# Patient Record
Sex: FEMALE | Race: WHITE | NOT HISPANIC OR LATINO
[De-identification: names, ages, dates, MRNs, and addresses within clinical notes are randomized per-mention and may not be internally consistent; named-entity substitution may affect disease eponyms.]

---

## 2018-10-08 ENCOUNTER — TRANSCRIPTION ENCOUNTER (OUTPATIENT)
Age: 53
End: 2018-10-08

## 2018-11-02 ENCOUNTER — TRANSCRIPTION ENCOUNTER (OUTPATIENT)
Age: 53
End: 2018-11-02

## 2018-11-12 ENCOUNTER — TRANSCRIPTION ENCOUNTER (OUTPATIENT)
Age: 53
End: 2018-11-12

## 2019-03-23 ENCOUNTER — RECORD ABSTRACTING (OUTPATIENT)
Age: 54
End: 2019-03-23

## 2019-03-23 DIAGNOSIS — R06.02 SHORTNESS OF BREATH: ICD-10-CM

## 2019-03-23 DIAGNOSIS — K21.9 GASTRO-ESOPHAGEAL REFLUX DISEASE W/OUT ESOPHAGITIS: ICD-10-CM

## 2019-03-23 DIAGNOSIS — Z80.1 FAMILY HISTORY OF MALIGNANT NEOPLASM OF TRACHEA, BRONCHUS AND LUNG: ICD-10-CM

## 2019-03-23 DIAGNOSIS — Z80.41 FAMILY HISTORY OF MALIGNANT NEOPLASM OF OVARY: ICD-10-CM

## 2019-03-23 DIAGNOSIS — J45.909 UNSPECIFIED ASTHMA, UNCOMPLICATED: ICD-10-CM

## 2019-03-23 DIAGNOSIS — Z87.891 PERSONAL HISTORY OF NICOTINE DEPENDENCE: ICD-10-CM

## 2019-03-23 DIAGNOSIS — G43.909 MIGRAINE, UNSPECIFIED, NOT INTRACTABLE, W/OUT STATUS MIGRAINOSUS: ICD-10-CM

## 2019-03-23 DIAGNOSIS — E55.9 VITAMIN D DEFICIENCY, UNSPECIFIED: ICD-10-CM

## 2019-03-23 DIAGNOSIS — R51 HEADACHE: ICD-10-CM

## 2019-03-23 DIAGNOSIS — R07.89 OTHER CHEST PAIN: ICD-10-CM

## 2019-03-23 DIAGNOSIS — J30.9 ALLERGIC RHINITIS, UNSPECIFIED: ICD-10-CM

## 2019-03-23 DIAGNOSIS — F41.9 ANXIETY DISORDER, UNSPECIFIED: ICD-10-CM

## 2019-03-23 DIAGNOSIS — Z80.0 FAMILY HISTORY OF MALIGNANT NEOPLASM OF DIGESTIVE ORGANS: ICD-10-CM

## 2019-03-23 DIAGNOSIS — M72.2 PLANTAR FASCIAL FIBROMATOSIS: ICD-10-CM

## 2019-03-23 DIAGNOSIS — Z71.3 DIETARY COUNSELING AND SURVEILLANCE: ICD-10-CM

## 2019-03-23 DIAGNOSIS — Z83.79 FAMILY HISTORY OF OTHER DISEASES OF THE DIGESTIVE SYSTEM: ICD-10-CM

## 2019-03-23 DIAGNOSIS — Z78.9 OTHER SPECIFIED HEALTH STATUS: ICD-10-CM

## 2019-03-23 LAB — CYTOLOGY CVX/VAG DOC THIN PREP: NORMAL

## 2019-03-23 RX ORDER — ALBUTEROL SULFATE 90 UG/1
108 (90 BASE) AEROSOL, METERED RESPIRATORY (INHALATION)
Refills: 0 | Status: ACTIVE | COMMUNITY

## 2019-04-15 ENCOUNTER — APPOINTMENT (OUTPATIENT)
Dept: CARDIOLOGY | Facility: CLINIC | Age: 54
End: 2019-04-15
Payer: COMMERCIAL

## 2019-04-15 ENCOUNTER — NON-APPOINTMENT (OUTPATIENT)
Age: 54
End: 2019-04-15

## 2019-04-15 VITALS
BODY MASS INDEX: 36.26 KG/M2 | DIASTOLIC BLOOD PRESSURE: 70 MMHG | HEART RATE: 90 BPM | HEIGHT: 71 IN | OXYGEN SATURATION: 95 % | SYSTOLIC BLOOD PRESSURE: 110 MMHG | WEIGHT: 259 LBS

## 2019-04-15 DIAGNOSIS — Z87.09 PERSONAL HISTORY OF OTHER DISEASES OF THE RESPIRATORY SYSTEM: ICD-10-CM

## 2019-04-15 PROCEDURE — 99214 OFFICE O/P EST MOD 30 MIN: CPT

## 2019-04-15 PROCEDURE — 93000 ELECTROCARDIOGRAM COMPLETE: CPT

## 2019-04-15 RX ORDER — AMLODIPINE BESYLATE 5 MG/1
5 TABLET ORAL
Refills: 0 | Status: DISCONTINUED | COMMUNITY
End: 2019-04-15

## 2019-04-19 ENCOUNTER — TRANSCRIPTION ENCOUNTER (OUTPATIENT)
Age: 54
End: 2019-04-19

## 2019-05-13 ENCOUNTER — TRANSCRIPTION ENCOUNTER (OUTPATIENT)
Age: 54
End: 2019-05-13

## 2019-05-17 ENCOUNTER — APPOINTMENT (OUTPATIENT)
Dept: OBGYN | Facility: CLINIC | Age: 54
End: 2019-05-17

## 2019-11-09 ENCOUNTER — TRANSCRIPTION ENCOUNTER (OUTPATIENT)
Age: 54
End: 2019-11-09

## 2019-11-09 DIAGNOSIS — K59.00 CONSTIPATION, UNSPECIFIED: ICD-10-CM

## 2019-12-08 PROBLEM — K59.00 CONSTIPATION: Status: ACTIVE | Noted: 2019-03-23

## 2020-03-12 ENCOUNTER — TRANSCRIPTION ENCOUNTER (OUTPATIENT)
Age: 55
End: 2020-03-12

## 2020-04-13 ENCOUNTER — TRANSCRIPTION ENCOUNTER (OUTPATIENT)
Age: 55
End: 2020-04-13

## 2020-08-21 ENCOUNTER — TRANSCRIPTION ENCOUNTER (OUTPATIENT)
Age: 55
End: 2020-08-21

## 2021-04-23 ENCOUNTER — TRANSCRIPTION ENCOUNTER (OUTPATIENT)
Age: 56
End: 2021-04-23

## 2021-09-24 ENCOUNTER — TRANSCRIPTION ENCOUNTER (OUTPATIENT)
Age: 56
End: 2021-09-24

## 2021-10-12 ENCOUNTER — APPOINTMENT (OUTPATIENT)
Dept: OBGYN | Facility: CLINIC | Age: 56
End: 2021-10-12

## 2021-10-12 DIAGNOSIS — Z12.11 ENCOUNTER FOR SCREENING FOR MALIGNANT NEOPLASM OF COLON: ICD-10-CM

## 2021-10-12 DIAGNOSIS — Z01.419 ENCOUNTER FOR GYNECOLOGICAL EXAMINATION (GENERAL) (ROUTINE) W/OUT ABNORMAL FINDINGS: ICD-10-CM

## 2021-10-12 DIAGNOSIS — Z12.31 ENCOUNTER FOR SCREENING MAMMOGRAM FOR MALIGNANT NEOPLASM OF BREAST: ICD-10-CM

## 2021-10-12 DIAGNOSIS — R92.2 INCONCLUSIVE MAMMOGRAM: ICD-10-CM

## 2021-10-12 DIAGNOSIS — Z13.820 ENCOUNTER FOR SCREENING FOR OSTEOPOROSIS: ICD-10-CM

## 2021-11-29 ENCOUNTER — TRANSCRIPTION ENCOUNTER (OUTPATIENT)
Age: 56
End: 2021-11-29

## 2021-12-30 ENCOUNTER — APPOINTMENT (OUTPATIENT)
Dept: OBGYN | Facility: CLINIC | Age: 56
End: 2021-12-30

## 2022-03-26 ENCOUNTER — TRANSCRIPTION ENCOUNTER (OUTPATIENT)
Age: 57
End: 2022-03-26

## 2022-06-14 ENCOUNTER — NON-APPOINTMENT (OUTPATIENT)
Age: 57
End: 2022-06-14

## 2023-03-03 ENCOUNTER — TRANSCRIPTION ENCOUNTER (OUTPATIENT)
Age: 58
End: 2023-03-03

## 2023-04-28 ENCOUNTER — NON-APPOINTMENT (OUTPATIENT)
Age: 58
End: 2023-04-28

## 2023-05-08 ENCOUNTER — NON-APPOINTMENT (OUTPATIENT)
Age: 58
End: 2023-05-08

## 2023-05-09 ENCOUNTER — RESULT REVIEW (OUTPATIENT)
Age: 58
End: 2023-05-09

## 2023-05-09 ENCOUNTER — APPOINTMENT (OUTPATIENT)
Dept: HEMATOLOGY ONCOLOGY | Facility: CLINIC | Age: 58
End: 2023-05-09
Payer: COMMERCIAL

## 2023-05-09 VITALS
RESPIRATION RATE: 72 BRPM | HEIGHT: 71 IN | DIASTOLIC BLOOD PRESSURE: 71 MMHG | WEIGHT: 251.5 LBS | TEMPERATURE: 98.1 F | SYSTOLIC BLOOD PRESSURE: 127 MMHG | HEART RATE: 72 BPM | OXYGEN SATURATION: 97 % | BODY MASS INDEX: 35.21 KG/M2

## 2023-05-09 DIAGNOSIS — Z71.83 ENCOUNTER FOR NONPROCREATIVE GENETIC COUNSELING: ICD-10-CM

## 2023-05-09 DIAGNOSIS — J01.90 ACUTE SINUSITIS, UNSPECIFIED: ICD-10-CM

## 2023-05-09 DIAGNOSIS — Z86.19 PERSONAL HISTORY OF OTHER INFECTIOUS AND PARASITIC DISEASES: ICD-10-CM

## 2023-05-09 PROCEDURE — 36415 COLL VENOUS BLD VENIPUNCTURE: CPT

## 2023-05-09 PROCEDURE — 99205 OFFICE O/P NEW HI 60 MIN: CPT | Mod: 25

## 2023-05-09 RX ORDER — LOSARTAN POTASSIUM AND HYDROCHLOROTHIAZIDE 12.5; 5 MG/1; MG/1
50-12.5 TABLET ORAL DAILY
Refills: 0 | Status: COMPLETED | COMMUNITY
End: 2023-05-09

## 2023-05-09 RX ORDER — FLUTICASONE PROPIONATE AND SALMETEROL 50; 250 UG/1; UG/1
250-50 POWDER RESPIRATORY (INHALATION)
Refills: 0 | Status: COMPLETED | COMMUNITY
End: 2023-05-09

## 2023-05-09 RX ORDER — PSYLLIUM SEED (WITH DEXTROSE)
30.9 POWDER (GRAM) ORAL
Refills: 0 | Status: COMPLETED | COMMUNITY
End: 2023-05-09

## 2023-05-09 RX ORDER — EZETIMIBE 10 MG/1
TABLET ORAL
Refills: 0 | Status: ACTIVE | COMMUNITY

## 2023-05-09 RX ORDER — CHROMIUM 200 MCG
TABLET ORAL
Refills: 0 | Status: COMPLETED | COMMUNITY
End: 2023-05-09

## 2023-05-09 RX ORDER — FLUTICASONE PROPIONATE 50 MCG
50 SPRAY, SUSPENSION NASAL
Refills: 0 | Status: ACTIVE | COMMUNITY

## 2023-05-09 RX ORDER — FUROSEMIDE 40 MG/1
40 TABLET ORAL
Refills: 0 | Status: ACTIVE | COMMUNITY

## 2023-05-09 RX ORDER — HYDROCHLOROTHIAZIDE 12.5 MG/1
12.5 CAPSULE ORAL
Refills: 0 | Status: COMPLETED | COMMUNITY
End: 2023-05-09

## 2023-05-09 RX ORDER — OMEGA-3/DHA/EPA/FISH OIL 300-1000MG
1000 CAPSULE ORAL
Refills: 0 | Status: COMPLETED | COMMUNITY
End: 2023-05-09

## 2023-05-09 RX ORDER — RANITIDINE HYDROCHLORIDE 150 MG/1
150 TABLET, FILM COATED ORAL
Refills: 0 | Status: COMPLETED | COMMUNITY
End: 2023-05-09

## 2023-05-09 RX ORDER — GALCANEZUMAB 120 MG/ML
INJECTION, SOLUTION SUBCUTANEOUS
Refills: 0 | Status: ACTIVE | COMMUNITY

## 2023-05-09 RX ORDER — LOSARTAN POTASSIUM 25 MG/1
25 TABLET, FILM COATED ORAL
Refills: 0 | Status: COMPLETED | COMMUNITY
End: 2023-05-09

## 2023-05-09 RX ORDER — LACTULOSE 10 G/15ML
10 SOLUTION ORAL
Qty: 450 | Refills: 5 | Status: COMPLETED | COMMUNITY
Start: 2019-12-08 | End: 2023-05-09

## 2023-05-09 NOTE — HISTORY OF PRESENT ILLNESS
[de-identified] : 58 year old female presents today for initial consultation of hypogammaglobinemia, referred by Dr. Ingram.  She has been following with neurology for complaints of generalized pain in muscles and joints.  She had a normal EMG in both upper and lower extremities.  Blood work on 23 revealed hypogammaglobulinemia, negative ANCA, RF, CCP DS DNA, ESR, CRP, normal irons, B12 and folic acid, and immunofixation.  She reports getting sick often, repeated sinus infections 4-5 times per year, URI, bronchitis, and pneumonia x 3 over the past 10 years.  She reports she is still having muscle weakness in bilateral thighs which causes difficulty climbing stairs (she is on atorvastatin 80mg daily and has not held it, due to a questionable TIA last year.)  \par \par She denies ever having Pneumovax  \par \par Health Maintenance-\par Mammogram- due now\par Colonoscopy/EGD- approximately 7 years ago \par Pelvic/pap- many years ago \par \par Family Hx-\par Mother  from metastatic lung cancer (smoker) dx'd 67, she also had ovarian cancer at age 55\par Mother is the youngest of 14\par \par \par Social Hx-\par Former smoker, quit 15 yrs ago 1PPD x 10 years\par Rarely consumes ETOH\par Occasional marijuana (gummies) for myalgias\par works FT as a

## 2023-05-09 NOTE — ASSESSMENT
[FreeTextEntry1] : 58-year-old female referred for evaluation of increased frequency of infections and hypogammaglobulinemia by Dr. Izabella Ingram.  Patient reports increased frequency of the infections sinusitis and bronchitis requiring antibiotics at least 6 times per year.  She does have a cat at home and was tested and she is not allergic.  She was status tested for other allergies in the past and the results were negative.\par She was seen by ENT for sinusitis and there was no evidence of anatomical changes that would cause increased frequency of infections.\par She had COVID twice in 3/2023, 12/2021 \par \par Sinusitis - seen by ENT, seasonal allergies, she had allergy testing, not completed.  See by Dr. Grider for allergy testing. \par Cat at home, not allergic. \par Tetanus vaccine- - 2 years ago. \par Reviewed with the patient and her  plan for testing for immunoglobulins including IgG subtypes antibodies to Streptococcus pneumonia and tetanus measles mumps rubella.  Should patient have no antibodies to Streptococcus she will obtain pneumonia vaccine and be retested for response.  We will also send flow cytometry to rule out potential  underlying splenic lymphoma.\par \par #Family history of ovarian cancer in mother\par Patient offered genetic testing.\par We discussed:\par Plan for genetic panel.  \par Reviewed with patient impact of positive vs negative results and that test might not be informative or . \par We discussed that blood related family members might be carrying the same genetic mutation.\par Test confidentiality. \par Options or limitations of medical surveillance and strategies for prevention after genetic testing results.\par Importance of sharing genetic test results with at-risk relatives they might benefit from this information. \par Plan for follow up after testing\par

## 2023-05-09 NOTE — REVIEW OF SYSTEMS
[Fatigue] : fatigue [Diarrhea: Grade 0] : Diarrhea: Grade 0 [Negative] : Allergic/Immunologic [FreeTextEntry2] : Increased frequency of infections requiring antibiotics

## 2023-05-09 NOTE — CONSULT LETTER
[Dear  ___] : Dear  [unfilled], [Consult Letter:] : I had the pleasure of evaluating your patient, [unfilled]. [Please see my note below.] : Please see my note below. [Consult Closing:] : Thank you very much for allowing me to participate in the care of this patient.  If you have any questions, please do not hesitate to contact me. [Sincerely,] : Sincerely, [FreeTextEntry3] : Edilia Arciniega MD\par [facility] [place] at [facility]\par

## 2023-05-22 ENCOUNTER — APPOINTMENT (OUTPATIENT)
Dept: HEMATOLOGY ONCOLOGY | Facility: CLINIC | Age: 58
End: 2023-05-22
Payer: COMMERCIAL

## 2023-05-22 DIAGNOSIS — R53.83 OTHER FATIGUE: ICD-10-CM

## 2023-05-22 PROCEDURE — 99213 OFFICE O/P EST LOW 20 MIN: CPT | Mod: 95

## 2023-06-04 NOTE — ASSESSMENT
[FreeTextEntry1] : 58-year-old female referred for evaluation of increased frequency of infections and hypogammaglobulinemia by Dr. Izabella Ingram.  Patient reports increased frequency of the infections sinusitis and bronchitis requiring antibiotics at least 6 times per year.  She does have a cat at home and was tested and she is not allergic.  She was status tested for other allergies in the past and the results were negative.\par She was seen by ENT for sinusitis and there was no evidence of anatomical changes that would cause increased frequency of infections.\par She had COVID twice in 3/2023, 12/2021 \par \par Sinusitis - seen by ENT, seasonal allergies, she had allergy testing, not completed.  See by Dr. Grider for allergy testing. \par Cat at home, not allergic. \par Tetanus vaccine- - 2 years ago. \par Reviewed with the patient and her  plan for testing for immunoglobulins including IgG subtypes antibodies to Streptococcus pneumonia and tetanus measles mumps rubella.  Should patient have no antibodies to Streptococcus she will obtain pneumonia vaccine and be retested for response.  We will also send flow cytometry to rule out potential  underlying splenic lymphoma.\par Lab results reviewed- no evidence of splenic lymphoma, IgG level borderline 601.\par Vaccinate for Strep pneumonia- check ab following vaccination. \par Schedule Pneumococcal vaccine\par \par #Family history of ovarian cancer in mother\par Patient offered genetic testing.\par We discussed:\par Plan for genetic panel.  \par Reviewed with patient impact of positive vs negative results and that test might not be informative or . \par We discussed that blood related family members might be carrying the same genetic mutation.\par Test confidentiality. \par Options or limitations of medical surveillance and strategies for prevention after genetic testing results.\par Importance of sharing genetic test results with at-risk relatives they might benefit from this information. \par Plan for follow up after testing\par \par # Fatigue- doesn’t sleep well, pain ion the knees, cramping, tingling, cramping.  \par

## 2023-06-04 NOTE — CONSULT LETTER
[Dear  ___] : Dear  [unfilled], [Consult Letter:] : I had the pleasure of evaluating your patient, [unfilled]. [Please see my note below.] : Please see my note below. [Consult Closing:] : Thank you very much for allowing me to participate in the care of this patient.  If you have any questions, please do not hesitate to contact me. [Sincerely,] : Sincerely, [FreeTextEntry3] : Edilia Arciniega MD\par [facility] [place] at [facility]\par

## 2023-06-04 NOTE — HISTORY OF PRESENT ILLNESS
[de-identified] : 58 year old female presents today for initial consultation of hypogammaglobinemia, referred by Dr. Ingram.  She has been following with neurology for complaints of generalized pain in muscles and joints.  She had a normal EMG in both upper and lower extremities.  Blood work on 23 revealed hypogammaglobulinemia, negative ANCA, RF, CCP DS DNA, ESR, CRP, normal irons, B12 and folic acid, and immunofixation.  She reports getting sick often, repeated sinus infections 4-5 times per year, URI, bronchitis, and pneumonia x 3 over the past 10 years.  She reports she is still having muscle weakness in bilateral thighs which causes difficulty climbing stairs (she is on atorvastatin 80mg daily and has not held it, due to a questionable TIA last year.)  \par \par She denies ever having Pneumovax  \par \par Health Maintenance-\par Mammogram- due now\par Colonoscopy/EGD- approximately 7 years ago \par Pelvic/pap- many years ago \par \par Family Hx-\par Mother  from metastatic lung cancer (smoker) dx'd 67, she also had ovarian cancer at age 55\par Mother is the youngest of 14\par \par \par Social Hx-\par Former smoker, quit 15 yrs ago 1PPD x 10 years\par Rarely consumes ETOH\par Occasional marijuana (gummies) for myalgias\par works FT as a   [Home] : at home, [unfilled] , at the time of the visit. [Medical Office: ([facility])___] : at the medical office located in  [Verbal consent obtained from patient] : the patient, [unfilled]

## 2023-06-04 NOTE — REASON FOR VISIT
[Initial Consultation] : an initial consultation for [FreeTextEntry2] : Hypogammaglobulinemia increased frequency of the infections

## 2023-06-19 ENCOUNTER — APPOINTMENT (OUTPATIENT)
Dept: HEMATOLOGY ONCOLOGY | Facility: CLINIC | Age: 58
End: 2023-06-19

## 2023-06-19 VITALS
OXYGEN SATURATION: 96 % | HEIGHT: 71 IN | HEART RATE: 71 BPM | WEIGHT: 247 LBS | RESPIRATION RATE: 16 BRPM | DIASTOLIC BLOOD PRESSURE: 80 MMHG | SYSTOLIC BLOOD PRESSURE: 133 MMHG | BODY MASS INDEX: 34.58 KG/M2 | TEMPERATURE: 97.6 F

## 2023-07-11 ENCOUNTER — APPOINTMENT (OUTPATIENT)
Dept: HEMATOLOGY ONCOLOGY | Facility: CLINIC | Age: 58
End: 2023-07-11
Payer: COMMERCIAL

## 2023-07-11 DIAGNOSIS — R49.0 DYSPHONIA: ICD-10-CM

## 2023-07-11 PROCEDURE — 99213 OFFICE O/P EST LOW 20 MIN: CPT | Mod: 95

## 2023-07-11 NOTE — HISTORY OF PRESENT ILLNESS
[de-identified] : 58 year old female presents today for initial consultation of hypogammaglobinemia, referred by Dr. Ingram.  She has been following with neurology for complaints of generalized pain in muscles and joints.  She had a normal EMG in both upper and lower extremities.  Blood work on 23 revealed hypogammaglobulinemia, negative ANCA, RF, CCP DS DNA, ESR, CRP, normal irons, B12 and folic acid, and immunofixation.  She reports getting sick often, repeated sinus infections 4-5 times per year, URI, bronchitis, and pneumonia x 3 over the past 10 years.  She reports she is still having muscle weakness in bilateral thighs which causes difficulty climbing stairs (she is on atorvastatin 80mg daily and has not held it, due to a questionable TIA last year.)  \par \par She denies ever having Pneumovax  \par \par Health Maintenance-\par Mammogram- due now\par Colonoscopy/EGD- approximately 7 years ago \par Pelvic/pap- many years ago \par \par Family Hx-\par Mother  from metastatic lung cancer (smoker) dx'd 67, she also had ovarian cancer at age 55\par Mother is the youngest of 14\par \par \par Social Hx-\par Former smoker, quit 15 yrs ago 1PPD x 10 years\par Rarely consumes ETOH\par Occasional marijuana (gummies) for myalgias\par works FT as a   [Home] : at home, [unfilled] , at the time of the visit. [Medical Office: ([facility])___] : at the medical office located in  [Verbal consent obtained from patient] : the patient, [unfilled]

## 2023-07-11 NOTE — CONSULT LETTER
[Dear  ___] : Dear  [unfilled], [Consult Letter:] : I had the pleasure of evaluating your patient, [unfilled]. [Consult Closing:] : Thank you very much for allowing me to participate in the care of this patient.  If you have any questions, please do not hesitate to contact me. [Please see my note below.] : Please see my note below. [Sincerely,] : Sincerely, [FreeTextEntry3] : Edilia Arciniega MD\par [facility] [place] at [facility]\par

## 2023-07-11 NOTE — ASSESSMENT
[FreeTextEntry1] : 58-year-old female referred for evaluation of increased frequency of infections and hypogammaglobulinemia by Dr. Izabella Ingram.  Patient reports increased frequency of the infections sinusitis and bronchitis requiring antibiotics at least 6 times per year.  She does have a cat at home and was tested and she is not allergic.  She was status tested for other allergies in the past and the results were negative.\par She was seen by ENT for sinusitis and there was no evidence of anatomical changes that would cause increased frequency of infections.\par She had COVID twice in 3/2023, 12/2021 \par \par Sinusitis - seen by ENT, seasonal allergies, she had allergy testing, not completed.  Seen by Dr. Grider for allergy testing. \par Cat at home, not allergic. \par Tetanus vaccine- - 2 years ago. \par Lab results reviewed- no evidence of splenic lymphoma, IgG level borderline 601.\par Vaccinated for Strep pneumonia- 6/19/23-check ab following vaccination. 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  Patient referred to thoracic surgeon .  She c/o hoarseness of the voice and ongoing mild cough. \par \par #Family history of ovarian cancer in mother\par [employer]- 6/2023- negative \par \par # Fatigue- doesn’t sleep well, pain ion the knees, cramping, tingling, cramping.  \par

## 2023-07-24 ENCOUNTER — APPOINTMENT (OUTPATIENT)
Dept: THORACIC SURGERY | Facility: HOSPITAL | Age: 58
End: 2023-07-24
Payer: COMMERCIAL

## 2023-07-26 ENCOUNTER — APPOINTMENT (OUTPATIENT)
Dept: THORACIC SURGERY | Facility: HOSPITAL | Age: 58
End: 2023-07-26
Payer: COMMERCIAL

## 2023-07-26 ENCOUNTER — APPOINTMENT (OUTPATIENT)
Dept: THORACIC SURGERY | Facility: CLINIC | Age: 58
End: 2023-07-26
Payer: COMMERCIAL

## 2023-07-26 VITALS
BODY MASS INDEX: 33.88 KG/M2 | OXYGEN SATURATION: 99 % | DIASTOLIC BLOOD PRESSURE: 91 MMHG | SYSTOLIC BLOOD PRESSURE: 142 MMHG | WEIGHT: 242 LBS | HEIGHT: 71 IN | HEART RATE: 76 BPM

## 2023-07-26 PROCEDURE — 99202 OFFICE O/P NEW SF 15 MIN: CPT

## 2023-07-26 NOTE — REVIEW OF SYSTEMS
[Feeling Tired] : feeling tired [Palpitations] : palpitations [Cough] : cough [SOB on Exertion] : shortness of breath during exertion [Negative] : Heme/Lymph [FreeTextEntry4] : constant throat clearing [FreeTextEntry9] : joint pain

## 2023-07-26 NOTE — PHYSICAL EXAM
[General Appearance - Alert] : alert [General Appearance - In No Acute Distress] : in no acute distress [General Appearance - Well Developed] : well developed [Sclera] : the sclera and conjunctiva were normal [PERRL With Normal Accommodation] : pupils were equal in size, round, and reactive to light [Outer Ear] : the ears and nose were normal in appearance [Neck Appearance] : the appearance of the neck was normal [Neck Cervical Mass (___cm)] : no neck mass was observed [Respiration, Rhythm And Depth] : normal respiratory rhythm and effort [Auscultation Breath Sounds / Voice Sounds] : lungs were clear to auscultation bilaterally [Heart Rate And Rhythm] : heart rate was normal and rhythm regular [Heart Sounds] : normal S1 and S2 [Examination Of The Chest] : the chest was normal in appearance [Abnormal Walk] : normal gait [Skin Color & Pigmentation] : normal skin color and pigmentation [] : no rash [Sensation] : the sensory exam was normal to light touch and pinprick [No Focal Deficits] : no focal deficits [Oriented To Time, Place, And Person] : oriented to person, place, and time [Impaired Insight] : insight and judgment were intact [Affect] : the affect was normal

## 2023-07-26 NOTE — HISTORY OF PRESENT ILLNESS
[FreeTextEntry1] : 58 year old female referred to heme/onc Dr. 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 Patient now referred to thoracic surgery for evaluation. Patient presents with her  and endorses that she was a previous smoker (quit 15 yrs ago). She reports fatigue, headaches, voice hoarseness, SOB on exertion, cough, and palpitations. She denies night sweats, weight loss, chest pain, hemoptysis.\par \par CT chest 6/12/2023 as follows:\par LUNGS: 0.2 cm solid nodule apical segment left upper lobe, image 47 series 6 0.4 cm groundglass nodule apical posterior segment left upper lobe image 47 series 6 \par \par LYMPH NODES AND MEDIASTINUM: 2.4 x 1.8 x 1.9 cm cystic mass in the anterior superior mediastinum anteriorly best seen on image 13 series 2. This may represent a thymic cyst or lymphangioma no enlarged lymph nodes are identified.

## 2023-07-26 NOTE — ASSESSMENT
[FreeTextEntry1] : 58 year old female referred to heme/onc Dr. 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 Patient now referred to thoracic surgery for evaluation. Patient presents with her  and endorses that she was a previous smoker (quit 15 yrs ago). She reports fatigue, headaches, voice hoarseness, SOB on exertion, cough, and palpitations. She denies night sweats, weight loss, chest pain, hemoptysis.\par \par CT chest 6/12/2023 as follows:\par LUNGS: 0.2 cm solid nodule apical segment left upper lobe, image 47 series 6 0.4 cm groundglass nodule apical posterior segment left upper lobe image 47 series 6 \par \par LYMPH NODES AND MEDIASTINUM: 2.4 x 1.8 x 1.9 cm cystic mass in the anterior superior mediastinum anteriorly best seen on image 13 series 2. This may represent a thymic cyst or lymphangioma no enlarged lymph nodes are identified. \par  \par Chest Imaging reviewed with patient and  and shows a 2.5 cm cystic lesion in superior anterior mediastinum consistent with thymic cyst or other benign cystic lesion. We will order an MRI of the chest to better characterize. She will followup after the scan.\par \par Plan:\par Chest MRI +/- contrast\par \par James NAGY MD personally performed the services described in the documentation, reviewed the documentation recorded by the scribe in my presence and it accurately and completely records my words and actions. I have personally seen, examined, and participated in the care of this patient.  I have reviewed all pertinent clinical information, including history and physical exam and plan.  I agree with the above history, physical and plan of the ACP which I have reviewed and edited where appropriate.\par \par Total time of 30  minutes with > 50% spent with the patient discussing radiologic studies, potential etiology, and need for chest MRI.\par \par \par \par

## 2023-07-31 ENCOUNTER — TRANSCRIPTION ENCOUNTER (OUTPATIENT)
Age: 58
End: 2023-07-31

## 2023-08-06 PROBLEM — R07.89 CHEST DISCOMFORT: Status: ACTIVE | Noted: 2019-04-15

## 2023-08-07 ENCOUNTER — APPOINTMENT (OUTPATIENT)
Dept: CARDIOLOGY | Facility: CLINIC | Age: 58
End: 2023-08-07
Payer: COMMERCIAL

## 2023-08-07 ENCOUNTER — NON-APPOINTMENT (OUTPATIENT)
Age: 58
End: 2023-08-07

## 2023-08-07 VITALS
WEIGHT: 242 LBS | BODY MASS INDEX: 33.88 KG/M2 | DIASTOLIC BLOOD PRESSURE: 80 MMHG | HEIGHT: 71 IN | OXYGEN SATURATION: 100 % | HEART RATE: 72 BPM | SYSTOLIC BLOOD PRESSURE: 122 MMHG

## 2023-08-07 DIAGNOSIS — R07.89 OTHER CHEST PAIN: ICD-10-CM

## 2023-08-07 PROCEDURE — 93246 EXT ECG>7D<15D RECORDING: CPT

## 2023-08-07 PROCEDURE — 99204 OFFICE O/P NEW MOD 45 MIN: CPT | Mod: 25

## 2023-08-07 PROCEDURE — 93000 ELECTROCARDIOGRAM COMPLETE: CPT | Mod: 59

## 2023-08-07 RX ORDER — OMEPRAZOLE 20 MG/1
20 TABLET, DELAYED RELEASE ORAL
Refills: 0 | Status: DISCONTINUED | COMMUNITY
End: 2023-08-07

## 2023-08-07 RX ORDER — TOPIRAMATE 25 MG/1
25 TABLET, COATED ORAL TWICE DAILY
Refills: 0 | Status: DISCONTINUED | COMMUNITY
End: 2023-08-07

## 2023-08-07 RX ORDER — ATORVASTATIN CALCIUM 40 MG/1
40 TABLET, FILM COATED ORAL
Refills: 0 | Status: ACTIVE | COMMUNITY

## 2023-08-10 ENCOUNTER — NON-APPOINTMENT (OUTPATIENT)
Age: 58
End: 2023-08-10

## 2023-08-14 ENCOUNTER — RESULT REVIEW (OUTPATIENT)
Age: 58
End: 2023-08-14

## 2023-08-21 ENCOUNTER — APPOINTMENT (OUTPATIENT)
Dept: THORACIC SURGERY | Facility: CLINIC | Age: 58
End: 2023-08-21

## 2023-08-22 ENCOUNTER — APPOINTMENT (OUTPATIENT)
Dept: THORACIC SURGERY | Facility: CLINIC | Age: 58
End: 2023-08-22
Payer: COMMERCIAL

## 2023-08-22 PROCEDURE — 99213 OFFICE O/P EST LOW 20 MIN: CPT | Mod: 95

## 2023-08-22 NOTE — DATA REVIEWED
[FreeTextEntry1] : CT chest 6/12/2023 as follows: LUNGS: 0.2 cm solid nodule apical segment left upper lobe, image 47 series 6 0.4 cm groundglass nodule apical posterior segment left upper lobe image 47 series 6 LYMPH NODES AND MEDIASTINUM: 2.4 x 1.8 x 1.9 cm cystic mass in the anterior superior mediastinum anteriorly best seen on image 13 series 2. This may represent a thymic cyst or lymphangioma no enlarged lymph nodes are identified. Chest Imaging reviewed with patient and  and shows a 2.5 cm cystic lesion in superior anterior mediastinum consistent with thymic cyst or other benign cystic lesion. We will order an MRI of the chest to better characterize. She will followup after the scan.

## 2023-08-22 NOTE — REASON FOR VISIT
[Home] : at home, [unfilled] , at the time of the visit. [Medical Office: ([facility])___] : at the medical office located in  [Follow-Up: _____] : a [unfilled] follow-up visit

## 2023-08-22 NOTE — HISTORY OF PRESENT ILLNESS
[FreeTextEntry1] : 58 year old female referred to heme/onc Dr. 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 Chest Imaging reviewed, imaging shows a 2.5 cm cystic lesion in superior anterior mediastinum consistent with thymic cyst or other benign cystic lesion. She presents today to review an MRI of the chest to better characterize. Results are as follows;  MRI chest +/- contrast 8/14/23: -Lower cervical cystic lesion just inferior to the thyroid gland, retrospectively stable since MRI of the neck from 06/10/2022. 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 Differential would include an exophytic thyroid cyst or perhaps a lesion of lymphatic origin (i.e. lymphangioma). 
16

## 2023-08-22 NOTE — ASSESSMENT
[FreeTextEntry1] : 58 year old female referred to heme/onc Dr. 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 Chest Imaging reviewed, imaging shows a 2.5 cm cystic lesion in superior anterior mediastinum consistent with thymic cyst or other benign cystic lesion. She presents today to review an MRI of the chest to better characterize. Results are as follows;  MRI chest +/- contrast 8/14/23: -Lower cervical cystic lesion just inferior to the thyroid gland, retrospectively stable since MRI of the neck from 06/10/2022. 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 Differential would include an exophytic thyroid cyst or perhaps a lesion of lymphatic origin (i.e. lymphangioma).  Patient is doing well. MRI reviewed with patient and shows a cystic lesion consistent with a thymic cyst and possibly a thyroid cyst. The cystic lesion is unlikely to be the cause of her raspy voice. Patient to see ENT for her voice and thyroid and with us in 1 year with a chest MRI.  Plan: 1. ENT visit 2. Chest MRI in 1 year  IJames MD personally performed the services described in the documentation, reviewed the documentation recorded by the scribe in my presence and it accurately and completely records my words and actions. I have personally seen, examined, and participated in the care of this patient.  I have reviewed all pertinent clinical information, including history and physical exam and plan.  I agree with the above history, physical and plan of the ACP which I have reviewed and edited where appropriate.  Total time of 20 minutes with > 50% spent with the patient discussing radiologic studies, potential diagnoses, and need for surveillance in 1 year with a chest MRI.

## 2023-08-24 ENCOUNTER — TRANSCRIPTION ENCOUNTER (OUTPATIENT)
Age: 58
End: 2023-08-24

## 2023-09-01 ENCOUNTER — APPOINTMENT (OUTPATIENT)
Dept: RHEUMATOLOGY | Facility: CLINIC | Age: 58
End: 2023-09-01
Payer: COMMERCIAL

## 2023-09-01 VITALS
BODY MASS INDEX: 34.02 KG/M2 | HEIGHT: 71 IN | DIASTOLIC BLOOD PRESSURE: 80 MMHG | OXYGEN SATURATION: 97 % | WEIGHT: 243 LBS | HEART RATE: 85 BPM | SYSTOLIC BLOOD PRESSURE: 120 MMHG | RESPIRATION RATE: 17 BRPM

## 2023-09-01 DIAGNOSIS — M54.89 OTHER DORSALGIA: ICD-10-CM

## 2023-09-01 DIAGNOSIS — M19.90 UNSPECIFIED OSTEOARTHRITIS, UNSPECIFIED SITE: ICD-10-CM

## 2023-09-01 DIAGNOSIS — M79.10 MYALGIA, UNSPECIFIED SITE: ICD-10-CM

## 2023-09-01 LAB
ALBUMIN SERPL ELPH-MCNC: 4.7 G/DL
ALP BLD-CCNC: 124 U/L
ALT SERPL-CCNC: 29 U/L
ANION GAP SERPL CALC-SCNC: 12 MMOL/L
AST SERPL-CCNC: 22 U/L
BILIRUB SERPL-MCNC: 0.6 MG/DL
BUN SERPL-MCNC: 20 MG/DL
CALCIUM SERPL-MCNC: 9.7 MG/DL
CHLORIDE SERPL-SCNC: 104 MMOL/L
CK SERPL-CCNC: 154 U/L
CO2 SERPL-SCNC: 26 MMOL/L
CREAT SERPL-MCNC: 1.05 MG/DL
CRP SERPL-MCNC: 9 MG/L
EGFR: 62 ML/MIN/1.73M2
ERYTHROCYTE [SEDIMENTATION RATE] IN BLOOD BY WESTERGREN METHOD: 5 MM/HR
GLUCOSE SERPL-MCNC: 95 MG/DL
POTASSIUM SERPL-SCNC: 3.7 MMOL/L
PROT SERPL-MCNC: 6.5 G/DL
RHEUMATOID FACT SER QL: <10 IU/ML
SODIUM SERPL-SCNC: 141 MMOL/L

## 2023-09-01 PROCEDURE — 99205 OFFICE O/P NEW HI 60 MIN: CPT | Mod: 25

## 2023-09-01 PROCEDURE — 36415 COLL VENOUS BLD VENIPUNCTURE: CPT

## 2023-09-02 LAB
APPEARANCE: CLEAR
BACTERIA: NEGATIVE /HPF
BILIRUBIN URINE: NEGATIVE
BLOOD URINE: NEGATIVE
CAST: 0 /LPF
CCP AB SER IA-ACNC: <8 UNITS
COLOR: NORMAL
CREAT SPEC-SCNC: 204 MG/DL
CREAT/PROT UR: 0.1 RATIO
DSDNA AB SER-ACNC: <12 IU/ML
EPITHELIAL CELLS: 1 /HPF
GLUCOSE QUALITATIVE U: NEGATIVE MG/DL
KETONES URINE: NEGATIVE MG/DL
LEUKOCYTE ESTERASE URINE: NEGATIVE
MICROSCOPIC-UA: NORMAL
NITRITE URINE: NEGATIVE
PH URINE: 6
PROT UR-MCNC: 15 MG/DL
PROTEIN URINE: NEGATIVE MG/DL
RED BLOOD CELLS URINE: 2 /HPF
RF+CCP IGG SER-IMP: NEGATIVE
SPECIFIC GRAVITY URINE: >1.03
UROBILINOGEN URINE: 1 MG/DL
WHITE BLOOD CELLS URINE: 0 /HPF

## 2023-09-03 LAB — RNA POLYMERASE III IGG: 4 UNITS

## 2023-09-04 LAB
ANA SER IF-ACNC: NEGATIVE
C3 SERPL-MCNC: 158 MG/DL
C4 SERPL-MCNC: 33 MG/DL
CHROMATIN AB SERPL-ACNC: <0.2 AL
ENA JO1 AB SER IA-ACNC: <0.2 AL
ENA RNP AB SER IA-ACNC: 0.2 AL
ENA SM AB SER IA-ACNC: <0.2 AL
ENA SS-A AB SER IA-ACNC: <0.2 AL
ENA SS-B AB SER IA-ACNC: <0.2 AL

## 2023-09-05 LAB — ALDOLASE SERPL-CCNC: 4.9 U/L

## 2023-09-06 LAB — HLA-B27 QL NAA+PROBE: NORMAL

## 2023-09-06 PROCEDURE — 93248 EXT ECG>7D<15D REV&INTERPJ: CPT

## 2023-09-08 NOTE — HISTORY OF PRESENT ILLNESS
[FreeTextEntry1] : 57 yo female referred by Dr. Arciniega for evaluation of joint pain. In Jan of this year her knees started to hurt, sharp pain and weakness in her thighs.  Started with only climbing stairs for a few weeks, then progressed to at rest. She works as a  and goes up and down stairs.   She had physical with PMD Dr. Roberts and was diagnosed with hypogammaglobulinemia so she was referred to Dr. LEE. She had CT and MRI and was found to have a cyst on her thyroid.  She saw endocrine and will have an US. It has since progressed to her hips and lower back, shoulders. Shoulders hurt so bad, she cant sleep. Her knuckles hurt to drive (hold steering wheel).  Pain is throbbing.  All joint pain is B/L.  She wakes up with achiness and and stiffness.  Gets better as she moves, but will get pains at rest. She doesnt sleep well.  She has fatigue. Her mother passed of cancer.  She was given pneumococcal vaccine but hasnt had f/u labs.  She gets bronchitis 6 times a year, URIs, she has had pneumonia 4 times in her life. Sibings without sickness. She has asthma. She saw ENT bc MRI for migraines showed "sinus disease".  But no intervention needed.  Gets sinusitis. No nasal polyps.  She also had an audiogram which showed 40% loss of hearing.  Think it is sensorineural hearing loss.  hse wears a hearing aid. She had an episode last June where she lost vision in her left eye for 3-4 minutes, dizzy, lightheaded.  An hour after that she had a migraine.  She was hospitalized at [place] for 3 days and had stroke workup.  It was ruled to be TIA vs ocular migraine. Since Jan she noted more headaches.  Didnt feel migraine or sinus infection.  No jaw tenderness or scalp tenderness.   She is losing her hair a lot. She had COVID twice before these symptoms started.  Last COVID was 3 months before this started. She wore a Holter for 2 weeks, just removed bc when at work she stood up, felt something in her throat put her heart on her chest and felt her heart racing.  Watch showed heart rate of 203 bpm.  She went to hospital and her HR and BP decreased.  Did not have an episode when wearing heart monitor.  She sees Dr. Lobato. She had another episode a year ago in Dr. Ingram's office where her heart rate increased.  She went back to the hospital and was put on blood thinners for 2 weeks.  Atorvastatin was increased from 10 to 80 mg.  Dose increased June 2022.  Never had trial off statins. She was found to be positive for 1 band of lyme so Dr. Roberts gave her Doxycycline for 28 days with no improvement.  She saw Dr. Villalba who did not think she had lyme and should not have taken the antibiotics. Has not taken steroids at all since January.  She had a normal EMG in both upper and lower extremities. Blood work on 2/23/23 revealed hypogammaglobulinemia, negative ANCA, RF, CCP DS DNA, ESR, CRP, normal irons, B12 and folic acid, and immunofixation.  She notes weakness in her thighs but can stand without hands. Knees swelled initially. She had one patch on her right upper arm, bubbled, leaked clear fluid, crusted over and went away.  Never saw aime.  Happened during winter. No photosensitivity but cannot tolerate heat. No other rashes. No oral ulcers. + dry eyes and mouth She has a raspy voice, but cyst is pushing on windpipe. + GERD-cannot tolerate citrus + constipation  No Raynauds but hands an feet always cold. Has had ankle pain for years.  xrays normal.  Sees Dr. Tirado.

## 2023-09-08 NOTE — DATA REVIEWED
[FreeTextEntry1] : [facility]

## 2023-09-08 NOTE — PHYSICAL EXAM
[TextEntry] : General: Alert, No acute distress Head: Normocephalic, Atraumatic, No scalp lesions, Parotids normal Eyes: No dryness, EOMI, PERRL Ears: No lesions, No tophi Oral cavity: Moist mucous membrane, No lesions Neck/Thyroid: Supple, No thyromegaly, No midline tenderness, Full range of motion Skin: psoriasis B/L ears and superior part of opening of ear canals, No nodules Back: Full range of motion, No paraspinal muscle tenderness, B/L SI joint tenderness Extremities: No clubbing or cyanosis, No edema, hyperextension of elbows and knees Joint Tenderness: B/L PIP2, 3, B/L ankles laterally, B/L MTP1, 2, MCP1, B/L shoulders, tenderness medial and lateral joint line with flexion/extension of knees Joint Swelling: left 3rd finger dactylitis Joint ROM: full passive ROM of shoulders, active ROM 90 degrees Degenerative changes: B/L knee crepitus Joint Deformity: none Muscle Tender Points: Bilateral, Low cervical, Anterior chest wall, Occipital, Trapezius, Supraspinatus, Gluteal, Greater trochanteric, Lateral epicondylar, Medial knee Beighton 4/9 tenderness B/L lateral epicondyle tenderness

## 2023-09-14 ENCOUNTER — TRANSCRIPTION ENCOUNTER (OUTPATIENT)
Age: 58
End: 2023-09-14

## 2023-09-14 ENCOUNTER — RESULT REVIEW (OUTPATIENT)
Age: 58
End: 2023-09-14

## 2023-09-21 LAB
EJ AB SER QL: NEGATIVE
ENA JO1 AB SER IA-ACNC: <20 UNITS
ENA PM/SCL AB SER-ACNC: <20 UNITS
ENA SM+RNP AB SER IA-ACNC: <20 UNITS
ENA SS-A IGG SER QL: <20 UNITS
FIBRILLARIN AB SER QL: NEGATIVE
KU AB SER QL: NEGATIVE
MDA-5 (P140)(CADM-140): <20 UNITS
MI2 AB SER QL: NEGATIVE
NXP-2 (P140): <20 UNITS
OJ AB SER QL: NEGATIVE
PL12 AB SER QL: NEGATIVE
PL7 AB SER QL: NEGATIVE
SRP AB SERPL QL: NEGATIVE
TIF GAMMA (P155/140): <20 UNITS
U2 SNRNP AB SER QL: NEGATIVE

## 2023-09-24 PROBLEM — R00.0 TACHYCARDIA: Status: ACTIVE | Noted: 2023-08-07

## 2023-09-25 ENCOUNTER — APPOINTMENT (OUTPATIENT)
Dept: CARDIOLOGY | Facility: CLINIC | Age: 58
End: 2023-09-25
Payer: COMMERCIAL

## 2023-09-25 ENCOUNTER — RESULT REVIEW (OUTPATIENT)
Age: 58
End: 2023-09-25

## 2023-09-25 VITALS
WEIGHT: 243 LBS | BODY MASS INDEX: 34.02 KG/M2 | SYSTOLIC BLOOD PRESSURE: 104 MMHG | HEART RATE: 76 BPM | HEIGHT: 71 IN | OXYGEN SATURATION: 98 % | DIASTOLIC BLOOD PRESSURE: 80 MMHG

## 2023-09-25 DIAGNOSIS — R00.0 TACHYCARDIA, UNSPECIFIED: ICD-10-CM

## 2023-09-25 PROCEDURE — 99213 OFFICE O/P EST LOW 20 MIN: CPT

## 2023-10-02 ENCOUNTER — RESULT REVIEW (OUTPATIENT)
Age: 58
End: 2023-10-02

## 2023-10-02 ENCOUNTER — APPOINTMENT (OUTPATIENT)
Dept: HEMATOLOGY ONCOLOGY | Facility: CLINIC | Age: 58
End: 2023-10-02

## 2023-10-02 VITALS
WEIGHT: 253 LBS | RESPIRATION RATE: 16 BRPM | OXYGEN SATURATION: 95 % | SYSTOLIC BLOOD PRESSURE: 115 MMHG | HEIGHT: 71 IN | HEART RATE: 68 BPM | TEMPERATURE: 97.8 F | DIASTOLIC BLOOD PRESSURE: 69 MMHG | BODY MASS INDEX: 35.42 KG/M2

## 2023-10-09 ENCOUNTER — NON-APPOINTMENT (OUTPATIENT)
Age: 58
End: 2023-10-09

## 2023-10-11 ENCOUNTER — APPOINTMENT (OUTPATIENT)
Dept: RHEUMATOLOGY | Facility: CLINIC | Age: 58
End: 2023-10-11
Payer: COMMERCIAL

## 2023-10-11 VITALS
RESPIRATION RATE: 16 BRPM | DIASTOLIC BLOOD PRESSURE: 90 MMHG | OXYGEN SATURATION: 96 % | WEIGHT: 250 LBS | BODY MASS INDEX: 35 KG/M2 | SYSTOLIC BLOOD PRESSURE: 135 MMHG | HEART RATE: 69 BPM | HEIGHT: 71 IN

## 2023-10-11 PROCEDURE — 99215 OFFICE O/P EST HI 40 MIN: CPT

## 2023-10-11 RX ORDER — UBROGEPANT 100 MG/1
TABLET ORAL
Refills: 0 | Status: DISCONTINUED | COMMUNITY
End: 2023-10-11

## 2023-10-11 RX ORDER — LORAZEPAM 1 MG/1
1 TABLET ORAL
Qty: 2 | Refills: 0 | Status: DISCONTINUED | COMMUNITY
Start: 2023-08-10 | End: 2023-10-11

## 2023-10-24 RX ORDER — TRIAMCINOLONE ACETONIDE 1 MG/G
0.1 OINTMENT TOPICAL TWICE DAILY
Qty: 1 | Refills: 1 | Status: ACTIVE | COMMUNITY
Start: 2023-10-11

## 2023-11-20 ENCOUNTER — APPOINTMENT (OUTPATIENT)
Dept: CARDIOLOGY | Facility: CLINIC | Age: 58
End: 2023-11-20
Payer: COMMERCIAL

## 2023-11-20 VITALS
SYSTOLIC BLOOD PRESSURE: 129 MMHG | WEIGHT: 250 LBS | OXYGEN SATURATION: 96 % | DIASTOLIC BLOOD PRESSURE: 78 MMHG | HEART RATE: 73 BPM | BODY MASS INDEX: 34.87 KG/M2

## 2023-11-20 DIAGNOSIS — Z01.810 ENCOUNTER FOR PREPROCEDURAL CARDIOVASCULAR EXAMINATION: ICD-10-CM

## 2023-11-20 PROCEDURE — 99213 OFFICE O/P EST LOW 20 MIN: CPT | Mod: 25

## 2023-11-20 PROCEDURE — 93000 ELECTROCARDIOGRAM COMPLETE: CPT | Mod: NC

## 2023-11-21 ENCOUNTER — NON-APPOINTMENT (OUTPATIENT)
Age: 58
End: 2023-11-21

## 2023-11-21 PROBLEM — Z01.810 PREOPERATIVE CARDIOVASCULAR EXAMINATION: Status: RESOLVED | Noted: 2023-11-20 | Resolved: 2023-11-21

## 2023-11-21 PROBLEM — Z01.810 PREOPERATIVE CARDIOVASCULAR EXAMINATION: Status: ACTIVE | Noted: 2023-11-21

## 2023-12-14 ENCOUNTER — APPOINTMENT (OUTPATIENT)
Dept: RHEUMATOLOGY | Facility: CLINIC | Age: 58
End: 2023-12-14
Payer: COMMERCIAL

## 2023-12-14 VITALS
WEIGHT: 242 LBS | SYSTOLIC BLOOD PRESSURE: 122 MMHG | HEART RATE: 69 BPM | HEIGHT: 71 IN | OXYGEN SATURATION: 99 % | DIASTOLIC BLOOD PRESSURE: 78 MMHG | BODY MASS INDEX: 33.88 KG/M2

## 2023-12-14 DIAGNOSIS — Z00.00 ENCOUNTER FOR GENERAL ADULT MEDICAL EXAMINATION W/OUT ABNORMAL FINDINGS: ICD-10-CM

## 2023-12-14 DIAGNOSIS — L40.9 PSORIASIS, UNSPECIFIED: ICD-10-CM

## 2023-12-14 PROCEDURE — 36415 COLL VENOUS BLD VENIPUNCTURE: CPT

## 2023-12-14 PROCEDURE — 99214 OFFICE O/P EST MOD 30 MIN: CPT | Mod: 25

## 2023-12-15 LAB
ALBUMIN SERPL ELPH-MCNC: 4.5 G/DL
ALP BLD-CCNC: 105 U/L
ALT SERPL-CCNC: 25 U/L
ANION GAP SERPL CALC-SCNC: 12 MMOL/L
AST SERPL-CCNC: 21 U/L
BASOPHILS # BLD AUTO: 0.13 K/UL
BASOPHILS NFR BLD AUTO: 1.7 %
BILIRUB SERPL-MCNC: 0.5 MG/DL
BUN SERPL-MCNC: 15 MG/DL
CALCIUM SERPL-MCNC: 9.5 MG/DL
CHLORIDE SERPL-SCNC: 102 MMOL/L
CO2 SERPL-SCNC: 27 MMOL/L
CREAT SERPL-MCNC: 1.17 MG/DL
CRP SERPL-MCNC: 3 MG/L
EGFR: 54 ML/MIN/1.73M2
EOSINOPHIL # BLD AUTO: 0.22 K/UL
EOSINOPHIL NFR BLD AUTO: 2.9 %
ERYTHROCYTE [SEDIMENTATION RATE] IN BLOOD BY WESTERGREN METHOD: 16 MM/HR
GLUCOSE SERPL-MCNC: 101 MG/DL
HCT VFR BLD CALC: 47.6 %
HGB BLD-MCNC: 15.6 G/DL
IMM GRANULOCYTES NFR BLD AUTO: 0.1 %
LYMPHOCYTES # BLD AUTO: 2.34 K/UL
LYMPHOCYTES NFR BLD AUTO: 31 %
MAN DIFF?: NORMAL
MCHC RBC-ENTMCNC: 30.4 PG
MCHC RBC-ENTMCNC: 32.8 GM/DL
MCV RBC AUTO: 92.6 FL
MONOCYTES # BLD AUTO: 0.6 K/UL
MONOCYTES NFR BLD AUTO: 8 %
NEUTROPHILS # BLD AUTO: 4.24 K/UL
NEUTROPHILS NFR BLD AUTO: 56.3 %
PLATELET # BLD AUTO: 278 K/UL
POTASSIUM SERPL-SCNC: 4.5 MMOL/L
PROT SERPL-MCNC: 6.6 G/DL
RBC # BLD: 5.14 M/UL
RBC # FLD: 13.6 %
SODIUM SERPL-SCNC: 140 MMOL/L
WBC # FLD AUTO: 7.54 K/UL

## 2023-12-18 ENCOUNTER — APPOINTMENT (OUTPATIENT)
Dept: CARDIOLOGY | Facility: CLINIC | Age: 58
End: 2023-12-18
Payer: COMMERCIAL

## 2023-12-18 PROCEDURE — 36415 COLL VENOUS BLD VENIPUNCTURE: CPT

## 2023-12-19 LAB
ANION GAP SERPL CALC-SCNC: 8 MMOL/L
BUN SERPL-MCNC: 14 MG/DL
CALCIUM SERPL-MCNC: 9.3 MG/DL
CHLORIDE SERPL-SCNC: 106 MMOL/L
CO2 SERPL-SCNC: 28 MMOL/L
CREAT SERPL-MCNC: 1.07 MG/DL
EGFR: 61 ML/MIN/1.73M2
GLUCOSE SERPL-MCNC: 117 MG/DL
HCT VFR BLD CALC: 44.6 %
HGB BLD-MCNC: 14.1 G/DL
INR PPP: 0.85 RATIO
MCHC RBC-ENTMCNC: 30.6 PG
MCHC RBC-ENTMCNC: 31.6 GM/DL
MCV RBC AUTO: 96.7 FL
PLATELET # BLD AUTO: 246 K/UL
POTASSIUM SERPL-SCNC: 4.4 MMOL/L
PT BLD: 9.6 SEC
RBC # BLD: 4.61 M/UL
RBC # FLD: 13.6 %
SODIUM SERPL-SCNC: 143 MMOL/L
WBC # FLD AUTO: 5.92 K/UL

## 2023-12-30 ENCOUNTER — RX RENEWAL (OUTPATIENT)
Age: 58
End: 2023-12-30

## 2023-12-30 PROBLEM — L40.9 PSORIASIS: Status: ACTIVE | Noted: 2023-09-08

## 2023-12-30 PROBLEM — Z00.00 ENCOUNTER FOR PREVENTIVE HEALTH EXAMINATION: Status: ACTIVE | Noted: 2019-03-11

## 2023-12-30 NOTE — ASSESSMENT
[FreeTextEntry1] : increase SSz to 2 tabs BID Medrol pack for immediate relief  The patient's current disease and medications (SSz) necessitate close follow up to assess for progression of the disease and laboratory testing to assess for drug toxicity and response to treatment. Failure to follow up in a timely manner can result in laboratory abnormalities, poorly controlled disease, medication side effects or infectious complications.

## 2023-12-30 NOTE — HISTORY OF PRESENT ILLNESS
[FreeTextEntry1] : She started SSz 1 tab BID.  Her shoulders and knees hurt.  Sometimes when walkin billie gets pain in her Achilles Tendons. Her hands are less stiff.  She has had bad migraines fo ra week. Her neck started to hurt on the left 4 weeks ago.  No benefit with massage.  The psoriasis in her ears is better. She had a few patches of redness on her leg.  No redness oritchiness.  Went away afte ra few days.  + brain fog

## 2023-12-30 NOTE — PHYSICAL EXAM
[TextEntry] : General: Alert, No acute distress Head: Normocephalic, Atraumatic, No scalp lesions, Parotids normal Eyes: No dryness, EOMI, PERRL Ears: No lesions, No tophi Skin: psoriasis B/L ears and superior part of opening of ear canals, No nodules Extremities: No clubbing or cyanosis, No edema, hyperextension of elbows and knees Joint Tenderness: B/L knees, B/L shoulders in all ROM Joint Swelling: none Degenerative changes: B/L knee crepitus Joint Deformity: none Muscle Tender Points: Bilateral, Low cervical, Anterior chest wall, Occipital, Trapezius, Supraspinatus, Gluteal, Greater trochanteric, Lateral epicondylar, Medial knee

## 2024-01-04 ENCOUNTER — APPOINTMENT (OUTPATIENT)
Dept: CARDIOLOGY | Facility: CLINIC | Age: 59
End: 2024-01-04

## 2024-01-04 ENCOUNTER — TRANSCRIPTION ENCOUNTER (OUTPATIENT)
Age: 59
End: 2024-01-04

## 2024-01-29 ENCOUNTER — APPOINTMENT (OUTPATIENT)
Dept: HEMATOLOGY ONCOLOGY | Facility: CLINIC | Age: 59
End: 2024-01-29
Payer: COMMERCIAL

## 2024-01-29 VITALS
OXYGEN SATURATION: 97 % | WEIGHT: 259.19 LBS | SYSTOLIC BLOOD PRESSURE: 109 MMHG | BODY MASS INDEX: 36.29 KG/M2 | HEART RATE: 79 BPM | DIASTOLIC BLOOD PRESSURE: 74 MMHG | HEIGHT: 71 IN | RESPIRATION RATE: 16 BRPM

## 2024-01-29 DIAGNOSIS — D80.1 NONFAMILIAL HYPOGAMMAGLOBULINEMIA: ICD-10-CM

## 2024-01-29 DIAGNOSIS — J98.59 OTHER DISEASES OF MEDIASTINUM, NOT ELSEWHERE CLASSIFIED: ICD-10-CM

## 2024-01-29 DIAGNOSIS — E66.9 OBESITY, UNSPECIFIED: ICD-10-CM

## 2024-01-29 DIAGNOSIS — L40.50 ARTHROPATHIC PSORIASIS, UNSPECIFIED: ICD-10-CM

## 2024-01-29 DIAGNOSIS — E32.8 OTHER DISEASES OF THYMUS: ICD-10-CM

## 2024-01-29 PROCEDURE — 36415 COLL VENOUS BLD VENIPUNCTURE: CPT

## 2024-01-29 PROCEDURE — 99213 OFFICE O/P EST LOW 20 MIN: CPT

## 2024-01-29 RX ORDER — RIMEGEPANT SULFATE 75 MG/75MG
TABLET, ORALLY DISINTEGRATING ORAL
Refills: 0 | Status: ACTIVE | COMMUNITY

## 2024-01-29 RX ORDER — METHYLPREDNISOLONE 4 MG/1
4 TABLET ORAL
Qty: 1 | Refills: 0 | Status: COMPLETED | COMMUNITY
Start: 2023-12-14 | End: 2024-01-29

## 2024-01-29 NOTE — ASSESSMENT
[FreeTextEntry1] : 58-year-old female referred for evaluation of increased frequency of infections and hypogammaglobulinemia by Dr. Izabella Ingram. Patient reports increased frequency of the infections sinusitis and bronchitis requiring antibiotics at least 6 times per year. She does have a cat at home and was tested and she is not allergic. She was status tested for other allergies in the past and the results were negative.  She was seen by ENT for sinusitis and there was no evidence of anatomical changes that would cause increased frequency of infections.  She had COVID twice in 3/2023, 12/2021  Sinusitis - seen by ENT, seasonal allergies, she had allergy testing, not completed. Seen by Dr. Grider for allergy testing. - s/p pneumococcal vaccine June 2023 Cat at home, not allergic.  Tetanus vaccine- - 2 years ago.  Lab results reviewed- no evidence of splenic lymphoma, IgG level borderline 601.  Vaccinated for Strep pneumonia- 6/19/23-check ab following vaccination.  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 Patient referred to thoracic surgeon . She c/o hoarseness of the voice and ongoing mild cough. - MRI - due August 2024  #Family history of ovarian cancer in mother [facility]- 6/2023- negative  # Fatigue- doesn't sleep well, pain ion the knees, cramping, tingling, cramping - diagnosed with psoriatic arthritis and fibromyalgia .  Plan: MRI for August 2024 - strep ab testing with next labs  - RTC 9 months

## 2024-01-29 NOTE — CONSULT LETTER
[Dear  ___] : Dear  [unfilled], [Consult Letter:] : I had the pleasure of evaluating your patient, [unfilled]. [Please see my note below.] : Please see my note below. [Consult Closing:] : Thank you very much for allowing me to participate in the care of this patient.  If you have any questions, please do not hesitate to contact me. [Sincerely,] : Sincerely, [FreeTextEntry3] : Edilia Arciniega MD\par  [facility]\par

## 2024-01-29 NOTE — REVIEW OF SYSTEMS
[Fatigue] : fatigue [Diarrhea: Grade 0] : Diarrhea: Grade 0 [Negative] : Heme/Lymph [Joint Pain] : joint pain [Joint Stiffness] : joint stiffness [Muscle Pain] : muscle pain [Confused] : confusion [FreeTextEntry2] : severe [de-identified] : brain fog

## 2024-01-29 NOTE — REASON FOR VISIT
[Initial Consultation] : an initial consultation for [Follow-Up Visit] : a follow-up visit for [FreeTextEntry2] : Hypogammaglobulinemia increased frequency of the infections

## 2024-01-29 NOTE — HISTORY OF PRESENT ILLNESS
[Home] : at home, [unfilled] , at the time of the visit. [Medical Office: ([facility])___] : at the medical office located in  [Verbal consent obtained from patient] : the patient, [unfilled] [de-identified] : 58 year old female presents today for initial consultation of hypogammaglobinemia, referred by Dr. Ingram.  She has been following with neurology for complaints of generalized pain in muscles and joints.  She had a normal EMG in both upper and lower extremities.  Blood work on 23 revealed hypogammaglobulinemia, negative ANCA, RF, CCP DS DNA, ESR, CRP, normal irons, B12 and folic acid, and immunofixation.  She reports getting sick often, repeated sinus infections 4-5 times per year, URI, bronchitis, and pneumonia x 3 over the past 10 years.  She reports she is still having muscle weakness in bilateral thighs which causes difficulty climbing stairs (she is on atorvastatin 80mg daily and has not held it, due to a questionable TIA last year.)  \par  \par  She denies ever having Pneumovax  \par  \par  Health Maintenance-\par  Mammogram- due now\par  Colonoscopy/EGD- approximately 7 years ago \par  Pelvic/pap- many years ago \par  \par  Family Hx-\par  Mother  from metastatic lung cancer (smoker) dx'd 67, she also had ovarian cancer at age 55\par  Mother is the youngest of 14\par  \par  \par  Social Hx-\par  Former smoker, quit 15 yrs ago 1PPD x 10 years\par  Rarely consumes ETOH\par  Occasional marijuana (gummies) for myalgias\par  works [employer] as a   [de-identified] : Patient is following up for hypogammaglobulinemia  She followed up with her rheumatologist who placed her on [id number] BID PO. She was diagnosed with fibromyalgia and psoriatic arthritis in Oct 2023. She is having back pain, shoulder pain and body aches and brain fog.  Patient had a chest MRI on August 14th, 2023 and followed up with an endocrinologist for thyroid nodules. Shes been having some tachycardia events and went through the full write up. On Jan 24th, 2024 she had a loop recorder placed for monitoring.

## 2024-03-12 ENCOUNTER — APPOINTMENT (OUTPATIENT)
Dept: CARDIOLOGY | Facility: CLINIC | Age: 59
End: 2024-03-12
Payer: COMMERCIAL

## 2024-03-12 ENCOUNTER — APPOINTMENT (OUTPATIENT)
Dept: CARDIOLOGY | Facility: CLINIC | Age: 59
End: 2024-03-12

## 2024-03-12 PROCEDURE — 93290 INTERROG DEV EVAL ICPMS IP: CPT

## 2024-03-19 ENCOUNTER — TRANSCRIPTION ENCOUNTER (OUTPATIENT)
Age: 59
End: 2024-03-19

## 2024-04-03 RX ORDER — GABAPENTIN 100 MG/1
100 CAPSULE ORAL 3 TIMES DAILY
Qty: 90 | Refills: 2 | Status: ACTIVE | COMMUNITY
Start: 2024-01-29 | End: 1900-01-01

## 2024-04-08 ENCOUNTER — TRANSCRIPTION ENCOUNTER (OUTPATIENT)
Age: 59
End: 2024-04-08

## 2024-05-29 ENCOUNTER — APPOINTMENT (OUTPATIENT)
Dept: CARDIOLOGY | Facility: CLINIC | Age: 59
End: 2024-05-29
Payer: COMMERCIAL

## 2024-05-29 ENCOUNTER — NON-APPOINTMENT (OUTPATIENT)
Age: 59
End: 2024-05-29

## 2024-05-29 VITALS
OXYGEN SATURATION: 99 % | HEART RATE: 78 BPM | HEIGHT: 71 IN | DIASTOLIC BLOOD PRESSURE: 60 MMHG | SYSTOLIC BLOOD PRESSURE: 102 MMHG | WEIGHT: 244 LBS | BODY MASS INDEX: 34.16 KG/M2

## 2024-05-29 DIAGNOSIS — E78.5 HYPERLIPIDEMIA, UNSPECIFIED: ICD-10-CM

## 2024-05-29 DIAGNOSIS — R00.2 PALPITATIONS: ICD-10-CM

## 2024-05-29 DIAGNOSIS — I10 ESSENTIAL (PRIMARY) HYPERTENSION: ICD-10-CM

## 2024-05-29 PROCEDURE — 99214 OFFICE O/P EST MOD 30 MIN: CPT | Mod: 25

## 2024-05-29 PROCEDURE — 93000 ELECTROCARDIOGRAM COMPLETE: CPT

## 2024-05-29 PROCEDURE — G2211 COMPLEX E/M VISIT ADD ON: CPT | Mod: NC

## 2024-05-29 RX ORDER — SULFASALAZINE 500 MG/1
500 TABLET, DELAYED RELEASE ORAL
Qty: 60 | Refills: 1 | Status: DISCONTINUED | COMMUNITY
Start: 2023-10-11 | End: 2024-05-29

## 2024-05-29 RX ORDER — ERGOCALCIFEROL 1.25 MG/1
1.25 MG CAPSULE, LIQUID FILLED ORAL
Refills: 0 | Status: ACTIVE | COMMUNITY

## 2024-05-29 RX ORDER — BUPROPION HYDROCHLORIDE 450 MG/1
450 TABLET, FILM COATED, EXTENDED RELEASE ORAL
Refills: 0 | Status: ACTIVE | COMMUNITY

## 2024-05-29 RX ORDER — SEMAGLUTIDE 1 MG/.5ML
1 INJECTION, SOLUTION SUBCUTANEOUS
Refills: 0 | Status: ACTIVE | COMMUNITY

## 2024-06-25 DIAGNOSIS — I47.10 SUPRAVENTRICULAR TACHYCARDIA, UNSPECIFIED: ICD-10-CM

## 2024-06-25 RX ORDER — METOPROLOL TARTRATE 25 MG/1
25 TABLET, FILM COATED ORAL
Qty: 30 | Refills: 3 | Status: ACTIVE | COMMUNITY
Start: 2024-06-25 | End: 1900-01-01

## 2024-07-02 ENCOUNTER — APPOINTMENT (OUTPATIENT)
Dept: HEART AND VASCULAR | Facility: CLINIC | Age: 59
End: 2024-07-02

## 2024-07-08 PROCEDURE — 93298 REM INTERROG DEV EVAL SCRMS: CPT

## 2024-08-27 ENCOUNTER — APPOINTMENT (OUTPATIENT)
Dept: HEART AND VASCULAR | Facility: CLINIC | Age: 59
End: 2024-08-27

## 2024-09-22 ENCOUNTER — NON-APPOINTMENT (OUTPATIENT)
Age: 59
End: 2024-09-22

## 2024-09-23 ENCOUNTER — APPOINTMENT (OUTPATIENT)
Dept: NEPHROLOGY | Facility: CLINIC | Age: 59
End: 2024-09-23
Payer: COMMERCIAL

## 2024-09-23 VITALS
DIASTOLIC BLOOD PRESSURE: 78 MMHG | OXYGEN SATURATION: 98 % | BODY MASS INDEX: 31.92 KG/M2 | HEIGHT: 71 IN | SYSTOLIC BLOOD PRESSURE: 116 MMHG | WEIGHT: 228 LBS | HEART RATE: 88 BPM

## 2024-09-23 DIAGNOSIS — R79.89 OTHER SPECIFIED ABNORMAL FINDINGS OF BLOOD CHEMISTRY: ICD-10-CM

## 2024-09-23 DIAGNOSIS — R60.0 LOCALIZED EDEMA: ICD-10-CM

## 2024-09-23 DIAGNOSIS — I10 ESSENTIAL (PRIMARY) HYPERTENSION: ICD-10-CM

## 2024-09-23 DIAGNOSIS — N18.31 CHRONIC KIDNEY DISEASE, STAGE 3A: ICD-10-CM

## 2024-09-23 DIAGNOSIS — E87.6 HYPOKALEMIA: ICD-10-CM

## 2024-09-23 PROCEDURE — G2212 PROLONG OUTPT/OFFICE VIS: CPT

## 2024-09-23 PROCEDURE — 99204 OFFICE O/P NEW MOD 45 MIN: CPT

## 2024-09-23 NOTE — REVIEW OF SYSTEMS
[As Noted in HPI] : as noted in HPI [Fever] : no fever [Chills] : no chills [Feeling Poorly] : not feeling poorly [Dry Eyes] : no dryness of the eyes [Sore Throat] : no sore throat [Chest Pain] : no chest pain [Lower Ext Edema] : no lower extremity edema [Cough] : no cough [SOB on Exertion] : no shortness of breath during exertion [Abdominal Pain] : no abdominal pain [Dysuria] : no dysuria [Limb Pain] : no limb pain [Itching] : no itching [Dizziness] : no dizziness [Anxiety] : no anxiety [Muscle Weakness] : no muscle weakness

## 2024-09-23 NOTE — ASSESSMENT
[FreeTextEntry1] : 1.Elevated serum creatinine: Likely in the setting of poor oral intake, use of Diuretics (Lasix) use and Wegovy use.  Pt says she was recently started on Wegovy for weight loss ~ 1 month. On review, Scr stable at 1.07 on 12/13/23. Scr remained stable at 1.3 on 3/5/24. Last Scr was elevated at 1.5 on 8/2/24. Pt currently taking Lasix 40 mg daily for LE swelling. Advised to improve oral fluid intake ~ 2L per day. Importance of good BP control reviewed with patient. Check renal panel, UA, UPCR and serum intact PTH. Advised patient to avoid NSAIDs, RCAs, and other nephrotoxins. Monitor renal function.   2.HTN: Repeat BP reading in acceptable range during office visit. Continue to monitor BP on current BP meds. Low salt diet advised.  3.Leg edema: Pt. with no bilateral LE pitting edema on exam today. Will discontinue oral Lasix pending blood work. Low salt diet advised. Monitor daily weights.  4.Hypokalemia: Recent serum potassium was low at 3.1 on 8/2/24. Check serum potassium today. Monitor serum potassium.   Follow up pendig lab results.

## 2024-09-23 NOTE — PHYSICAL EXAM
[General Appearance - Alert] : alert [General Appearance - In No Acute Distress] : in no acute distress [General Appearance - Well Nourished] : well nourished [Sclera] : the sclera and conjunctiva were normal [Outer Ear] : the ears and nose were normal in appearance [Jugular Venous Distention Increased] : there was no jugular-venous distention [Auscultation Breath Sounds / Voice Sounds] : lungs were clear to auscultation bilaterally [Heart Sounds] : normal S1 and S2 [Heart Sounds Gallop] : no gallops [Edema] : there was no peripheral edema [Bowel Sounds] : normal bowel sounds [Abdomen Soft] : soft [No CVA Tenderness] : no ~M costovertebral angle tenderness [] : no rash [Nail Clubbing] : no clubbing  or cyanosis of the fingernails [No Focal Deficits] : no focal deficits [Affect] : the affect was normal [Oriented To Time, Place, And Person] : oriented to person, place, and time [Mood] : the mood was normal

## 2024-09-23 NOTE — HISTORY OF PRESENT ILLNESS
[FreeTextEntry1] : Pt is a 59-year-old female with history of HTN, HLD, hypogammaglobulinemia, GERD, psoriatic arthritis, migraine, and mediastinal mass who came to the clinic for the initial evaluation of elevated serum creatinine.    Pt says she is following with rheumatologist in [place] and taking Leflunomide for her psoriatic arthritis. Pt also following with Dr. Joesph Lobato (cardiologist) and had ILR placed recently. Pt says she was recently started on Wegovy for weight loss ~ 1 month. On review, Scr stable at 1.07 on 12/13/23. Scr remained stable at 1.3 on 3/5/24. Last Scr was elevated at 1.5 on 8/2/24. Labs also showed serum potassium was low at 3.1 on 8/2/24 (seen on pts phone). Pt says she was taking Amlodipine for BP but it was discontinued as she developed LE swelling. Pt subsequently started on Lasix 40 mg daily for LE swelling. Currently not on any BP meds (except Lasix) as BP is within range. Pt. Denies history of kidney disease or kidney stones in the past. No history of kidney disease in family. Denies recent use of NSAIDs/ motrin recently.   Pt. currently feels well. Pt. denies any chest pain, SOB, nausea, dysuria, weakness.

## 2024-09-24 ENCOUNTER — NON-APPOINTMENT (OUTPATIENT)
Age: 59
End: 2024-09-24

## 2024-09-24 LAB
ALBUMIN SERPL ELPH-MCNC: 4.2 G/DL
ANION GAP SERPL CALC-SCNC: 15 MMOL/L
APPEARANCE: CLEAR
BILIRUBIN URINE: NEGATIVE
BLOOD URINE: NEGATIVE
BUN SERPL-MCNC: 19 MG/DL
CALCIUM SERPL-MCNC: 9.9 MG/DL
CALCIUM SERPL-MCNC: 9.9 MG/DL
CHLORIDE SERPL-SCNC: 101 MMOL/L
CO2 SERPL-SCNC: 24 MMOL/L
COLOR: YELLOW
CREAT SERPL-MCNC: 1.46 MG/DL
CREAT SPEC-SCNC: 120 MG/DL
CREAT/PROT UR: 0.1 RATIO
EGFR: 41 ML/MIN/1.73M2
GLUCOSE QUALITATIVE U: NEGATIVE MG/DL
GLUCOSE SERPL-MCNC: 89 MG/DL
KETONES URINE: NEGATIVE MG/DL
LEUKOCYTE ESTERASE URINE: NEGATIVE
NITRITE URINE: NEGATIVE
PARATHYROID HORMONE INTACT: 58 PG/ML
PH URINE: 5.5
PHOSPHATE SERPL-MCNC: 3.3 MG/DL
POTASSIUM SERPL-SCNC: 4 MMOL/L
PROT UR-MCNC: 7 MG/DL
PROTEIN URINE: NEGATIVE MG/DL
SODIUM SERPL-SCNC: 140 MMOL/L
SPECIFIC GRAVITY URINE: 1.02
UROBILINOGEN URINE: 0.2 MG/DL

## 2024-10-21 ENCOUNTER — APPOINTMENT (OUTPATIENT)
Dept: HEMATOLOGY ONCOLOGY | Facility: CLINIC | Age: 59
End: 2024-10-21
Payer: COMMERCIAL

## 2024-10-21 ENCOUNTER — RESULT REVIEW (OUTPATIENT)
Age: 59
End: 2024-10-21

## 2024-10-21 VITALS
HEART RATE: 81 BPM | WEIGHT: 242 LBS | RESPIRATION RATE: 16 BRPM | SYSTOLIC BLOOD PRESSURE: 128 MMHG | BODY MASS INDEX: 33.88 KG/M2 | OXYGEN SATURATION: 97 % | DIASTOLIC BLOOD PRESSURE: 77 MMHG | HEIGHT: 71 IN | TEMPERATURE: 96.8 F

## 2024-10-21 DIAGNOSIS — J98.59 OTHER DISEASES OF MEDIASTINUM, NOT ELSEWHERE CLASSIFIED: ICD-10-CM

## 2024-10-21 DIAGNOSIS — E66.9 OBESITY, UNSPECIFIED: ICD-10-CM

## 2024-10-21 DIAGNOSIS — D80.1 NONFAMILIAL HYPOGAMMAGLOBULINEMIA: ICD-10-CM

## 2024-10-21 DIAGNOSIS — R06.02 SHORTNESS OF BREATH: ICD-10-CM

## 2024-10-21 PROCEDURE — 36415 COLL VENOUS BLD VENIPUNCTURE: CPT

## 2024-10-21 PROCEDURE — 99214 OFFICE O/P EST MOD 30 MIN: CPT

## 2024-10-21 RX ORDER — LORAZEPAM 0.5 MG/1
0.5 TABLET ORAL
Qty: 2 | Refills: 0 | Status: ACTIVE | COMMUNITY
Start: 2024-10-21 | End: 1900-01-01

## 2024-11-20 ENCOUNTER — APPOINTMENT (OUTPATIENT)
Dept: NEPHROLOGY | Facility: CLINIC | Age: 59
End: 2024-11-20
Payer: COMMERCIAL

## 2024-11-20 VITALS
DIASTOLIC BLOOD PRESSURE: 74 MMHG | SYSTOLIC BLOOD PRESSURE: 122 MMHG | WEIGHT: 235 LBS | HEART RATE: 81 BPM | BODY MASS INDEX: 32.78 KG/M2 | OXYGEN SATURATION: 95 %

## 2024-11-20 DIAGNOSIS — R79.89 OTHER SPECIFIED ABNORMAL FINDINGS OF BLOOD CHEMISTRY: ICD-10-CM

## 2024-11-20 DIAGNOSIS — E87.6 HYPOKALEMIA: ICD-10-CM

## 2024-11-20 DIAGNOSIS — R60.0 LOCALIZED EDEMA: ICD-10-CM

## 2024-11-20 DIAGNOSIS — I10 ESSENTIAL (PRIMARY) HYPERTENSION: ICD-10-CM

## 2024-11-20 PROCEDURE — 99213 OFFICE O/P EST LOW 20 MIN: CPT

## 2024-11-20 PROCEDURE — G2211 COMPLEX E/M VISIT ADD ON: CPT | Mod: NC

## 2024-11-23 ENCOUNTER — RX RENEWAL (OUTPATIENT)
Age: 59
End: 2024-11-23

## 2024-12-12 RX ORDER — METOPROLOL SUCCINATE 25 MG/1
25 TABLET, EXTENDED RELEASE ORAL
Qty: 30 | Refills: 5 | Status: ACTIVE | COMMUNITY
Start: 2024-12-12 | End: 1900-01-01

## 2025-03-22 ENCOUNTER — TRANSCRIPTION ENCOUNTER (OUTPATIENT)
Age: 60
End: 2025-03-22

## 2025-04-22 ENCOUNTER — APPOINTMENT (OUTPATIENT)
Dept: HEART AND VASCULAR | Facility: CLINIC | Age: 60
End: 2025-04-22
Payer: COMMERCIAL

## 2025-04-22 VITALS
WEIGHT: 234 LBS | OXYGEN SATURATION: 96 % | SYSTOLIC BLOOD PRESSURE: 110 MMHG | DIASTOLIC BLOOD PRESSURE: 78 MMHG | HEART RATE: 72 BPM | BODY MASS INDEX: 32.76 KG/M2 | HEIGHT: 71 IN

## 2025-04-22 DIAGNOSIS — I47.19 OTHER SUPRAVENTRICULAR TACHYCARDIA: ICD-10-CM

## 2025-04-22 DIAGNOSIS — R00.2 PALPITATIONS: ICD-10-CM

## 2025-04-22 DIAGNOSIS — I47.10 SUPRAVENTRICULAR TACHYCARDIA, UNSPECIFIED: ICD-10-CM

## 2025-04-22 PROCEDURE — 99204 OFFICE O/P NEW MOD 45 MIN: CPT | Mod: 25

## 2025-04-22 PROCEDURE — 93285 PRGRMG DEV EVAL SCRMS IP: CPT

## 2025-04-22 RX ORDER — LEFLUNOMIDE 20 MG/1
20 TABLET, FILM COATED ORAL
Refills: 0 | Status: ACTIVE | COMMUNITY

## 2025-04-22 RX ORDER — NORTRIPTYLINE HYDROCHLORIDE 10 MG/1
10 CAPSULE ORAL
Refills: 0 | Status: ACTIVE | COMMUNITY

## 2025-05-21 ENCOUNTER — APPOINTMENT (OUTPATIENT)
Dept: NEPHROLOGY | Facility: CLINIC | Age: 60
End: 2025-05-21
Payer: COMMERCIAL

## 2025-05-21 VITALS
SYSTOLIC BLOOD PRESSURE: 124 MMHG | WEIGHT: 230 LBS | DIASTOLIC BLOOD PRESSURE: 68 MMHG | HEART RATE: 142 BPM | BODY MASS INDEX: 32.08 KG/M2 | OXYGEN SATURATION: 98 %

## 2025-05-21 DIAGNOSIS — R79.89 OTHER SPECIFIED ABNORMAL FINDINGS OF BLOOD CHEMISTRY: ICD-10-CM

## 2025-05-21 DIAGNOSIS — E87.6 HYPOKALEMIA: ICD-10-CM

## 2025-05-21 DIAGNOSIS — I10 ESSENTIAL (PRIMARY) HYPERTENSION: ICD-10-CM

## 2025-05-21 DIAGNOSIS — R60.0 LOCALIZED EDEMA: ICD-10-CM

## 2025-05-21 PROCEDURE — G2211 COMPLEX E/M VISIT ADD ON: CPT | Mod: NC

## 2025-05-21 PROCEDURE — 99213 OFFICE O/P EST LOW 20 MIN: CPT

## 2025-05-26 ENCOUNTER — RX RENEWAL (OUTPATIENT)
Age: 60
End: 2025-05-26

## 2025-06-03 ENCOUNTER — TRANSCRIPTION ENCOUNTER (OUTPATIENT)
Age: 60
End: 2025-06-03

## 2025-09-12 ENCOUNTER — RX RENEWAL (OUTPATIENT)
Age: 60
End: 2025-09-12